# Patient Record
Sex: FEMALE | Race: BLACK OR AFRICAN AMERICAN | Employment: FULL TIME | URBAN - METROPOLITAN AREA
[De-identification: names, ages, dates, MRNs, and addresses within clinical notes are randomized per-mention and may not be internally consistent; named-entity substitution may affect disease eponyms.]

---

## 2017-08-15 ENCOUNTER — APPOINTMENT (OUTPATIENT)
Dept: PHYSICAL THERAPY | Age: 22
End: 2017-08-15

## 2017-09-07 ENCOUNTER — HOSPITAL ENCOUNTER (OUTPATIENT)
Dept: PHYSICAL THERAPY | Age: 22
Discharge: HOME | End: 2017-09-07
Payer: OTHER GOVERNMENT

## 2017-09-07 PROCEDURE — 97162 PT EVAL MOD COMPLEX 30 MIN: CPT

## 2017-09-07 PROCEDURE — 97530 THERAPEUTIC ACTIVITIES: CPT
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
## 2017-09-13 ENCOUNTER — HOSPITAL ENCOUNTER (OUTPATIENT)
Dept: PHYSICAL THERAPY | Age: 22
End: 2017-09-13
Payer: OTHER GOVERNMENT

## 2017-09-14 ENCOUNTER — HOSPITAL ENCOUNTER (OUTPATIENT)
Dept: PHYSICAL THERAPY | Age: 22
Discharge: HOME | End: 2017-09-14
Payer: OTHER GOVERNMENT

## 2017-09-14 PROCEDURE — 97530 THERAPEUTIC ACTIVITIES: CPT

## 2017-09-14 PROCEDURE — 97110 THERAPEUTIC EXERCISES: CPT

## 2017-09-14 NOTE — PROGRESS NOTES
PT DAILY TREATMENT NOTE     Patient Name: Zak Mis  Date:2017  : 1995  [x]  Patient  Verified  Payor:  / Plan: [facility]  ACTIVE DUTY AND DEPENDENTS / Product Type:  /    In time:5:29  Out time:6:21  Total Treatment Time (min): 52  Visit #: 2 of 12    Treatment Area: Dorsalgia, unspecified [M54.9]    SUBJECTIVE  Pain Level (0-10 scale): 0  Any medication changes, allergies to medications, adverse drug reactions, diagnosis change, or new procedure performed?: [x] No    [] Yes (see summary sheet for update)  Subjective functional status/changes:   [] No changes reported  \"I have been doing the exercises at home and it seems to be helping. \"    OBJECTIVE     37 min Therapeutic Exercise:  [x] See flow sheet :   Rationale: increase ROM, increase strength and improve coordination to improve the patients ability to complete daily activities with decreased pain and symptom levels. 15 min Therapeutic Activity:  [x]  See flow sheet :   Rationale: increase strength, improve coordination and increase proprioception  to improve the patients ability to complete daily activities with decreased pain and symptom levels. With   [] TE   [] TA   [] neuro   [] other: Patient Education: [x] Review HEP    [] Progressed/Changed HEP based on:   [] positioning   [] body mechanics   [] transfers   [] heat/ice application    [] other:      Other Objective/Functional Measures: Improved thoracic mobility with cat/camel     Pain Level (0-10 scale) post treatment: 0    ASSESSMENT/Changes in Function: Pt tolerated exercises well with no reports of increased pain at end of session. Improved thoracic mobility noted with cat/camel and prayer stretch.       Patient will continue to benefit from skilled PT services to modify and progress therapeutic interventions, address functional mobility deficits, address ROM deficits, address strength deficits, analyze and address soft tissue restrictions, analyze and modify body mechanics/ergonomics, assess and modify postural abnormalities and instruct in home and community integration to attain remaining goals. []  See Plan of Care  []  See progress note/recertification  []  See Discharge Summary         Progress towards goals / Updated goals:  Short Term Goals: STG- To be accomplished in 2 week(s):  1.  Pt will be independent with HEP to encourage prophylaxis. Eval:HEP dispensed  Current: Compliance per pt report       Long Term Goals: LTG- To be accomplished in 6 week(s):  1.  Pt will demonstrate ability to  son with pain < 1/10 to improve quality of life. Eval:9  Current: NA      2.  Pt lumbar AROM will be [facility] [facility] without pain in order to return to the gym. Eval:  ROM % AROM Comments:pain, area   Forward flexion 40-60 100%      Extension 20-30 100% p! SB right 20-30  tib tub      SB left 20-30 tib tub p! Rotation right 5-10 75% p! Rotation left 5-10 75%          Current: NA      3.  Pt bilateral HS and glut strength will improve to [facility] allow pt to carry laundry up the stairs without pain. Eval:4/5 bilaterally   Current: NA      4.  Pt FOTO score will increase by >15 points to show improvement in subjective function.   Eval:54  Current: will address at visit 5      PLAN  [x]  Upgrade activities as tolerated     [x]  Continue plan of care  []  Update interventions per flow sheet       []  Discharge due to:_  []  Other:_      Gilbert Arenas 9/14/2017  5:46 PM    Future Appointments  Date Time Provider [place] Pérez   9/20/2017 5:00 PM Brian Pritchardesic [facility]   9/21/2017 5:30 PM Brian Pritchardesic [facility]   9/27/2017 5:00 PM Gilbert Arenas [facility]   9/28/2017 5:30 PM Brian Pritchardesic [facility]

## 2017-09-20 ENCOUNTER — HOSPITAL ENCOUNTER (OUTPATIENT)
Dept: PHYSICAL THERAPY | Age: 22
Discharge: HOME | End: 2017-09-20
Payer: OTHER GOVERNMENT

## 2017-09-20 PROCEDURE — 97110 THERAPEUTIC EXERCISES: CPT

## 2017-09-20 PROCEDURE — 97530 THERAPEUTIC ACTIVITIES: CPT

## 2017-09-20 NOTE — PROGRESS NOTES
PT DAILY TREATMENT NOTE     Patient Name: Zurdo Coronel  Date:2017  : 1995  [x]  Patient  Verified  Payor:  / Plan: [facility]  ACTIVE DUTY AND DEPENDENTS / Product Type: [employer] /    In time:5:10  Out time:6:09  Total Treatment Time (min): 61  Visit #: 3 of 12    Treatment Area: Dorsalgia, unspecified [M54.9]    SUBJECTIVE  Pain Level (0-10 scale): 0  Any medication changes, allergies to medications, adverse drug reactions, diagnosis change, or new procedure performed?: [x] No    [] Yes (see summary sheet for update)  Subjective functional status/changes:   [] No changes reported  \" I still get the sharp pains in my back but I don't have to crack it anymore like I used to. \"    OBJECTIVE    44 min Therapeutic Exercise:  [x] See flow sheet :   Rationale: increase ROM, increase strength and improve coordination to improve the patients ability to complete daily activities with decreased pain and symptom levels. 15 min Therapeutic Activity:  [x]  See flow sheet :   Rationale: increase ROM, increase strength, improve coordination and increase proprioception  to improve the patients ability to complete daily activities with decreased pain and symptom levels. With   [] TE   [] TA   [] neuro   [] other: Patient Education: [x] Review HEP    [] Progressed/Changed HEP based on:   [] positioning   [] body mechanics   [] transfers   [] heat/ice application    [] other:      Other Objective/Functional Measures: inc lumbar ext with TRX sqauts     Pain Level (0-10 scale) post treatment: 0    ASSESSMENT/Changes in Function: Pt continues to be challenged with 3 way hip and clamshells due to glut fatigue. Verbal cues needed to correct form with 90/90 and TRX squats today to facilitate gluts and HS.      Patient will continue to benefit from skilled PT services to modify and progress therapeutic interventions, address functional mobility deficits, address ROM deficits, address strength deficits, analyze and cue movement patterns and analyze and modify body mechanics/ergonomics to attain remaining goals. []  See Plan of Care  []  See progress note/recertification  []  See Discharge Summary         Progress towards goals / Updated goals:  Short Term Goals: STG- To be accomplished in 2 week(s):  1.  Pt will be independent with HEP to encourage prophylaxis. Eval:HEP dispensed  Current: Compliance per pt report       Long Term Goals: LTG- To be accomplished in 6 week(s):  1.  Pt will demonstrate ability to  son with pain < 1/10 to improve quality of life. Eval:9  Current: NA      2.  Pt lumbar AROM will be [facility] without pain in order to return to the gym. Eval:  ROM % AROM Comments:pain, area   Forward flexion 40-60 100%      Extension 20-30 100% p! SB right 20-30  tib tub      SB left 20-30 tib tub p! Rotation right 5-10 75% p! Rotation left 5-10 75%          Current: NA      3.  Pt bilateral HS and glut strength will improve to [facility] allow pt to carry laundry up the stairs without pain. Eval:4/5 bilaterally   Current: NA      4.  Pt FOTO score will increase by >15 points to show improvement in subjective function.   Eval:54  Current: will address at visit 5      PLAN  [x]  Upgrade activities as tolerated     [x]  Continue plan of care  []  Update interventions per flow sheet       []  Discharge due to:_  []  Other:_      Marie Bro 9/20/2017  5:11 PM    Future Appointments  Date Time Provider [place] Pérez   9/21/2017 5:30 PM Marie Bro [facility]   9/27/2017 5:00 PM Javi Clinton [facility]   9/28/2017 5:30 PM Marie Bro [facility]

## 2017-09-27 ENCOUNTER — HOSPITAL ENCOUNTER (OUTPATIENT)
Dept: PHYSICAL THERAPY | Age: 22
Discharge: HOME | End: 2017-09-27
Payer: OTHER GOVERNMENT

## 2017-09-27 PROCEDURE — 97110 THERAPEUTIC EXERCISES: CPT

## 2017-09-27 PROCEDURE — 97530 THERAPEUTIC ACTIVITIES: CPT

## 2017-09-27 NOTE — PROGRESS NOTES
PT DAILY TREATMENT NOTE     Patient Name: Joe Session  Date:2017  : 1995  [x]  Patient  Verified  Payor:  / Plan: [facility]  ACTIVE DUTY AND DEPENDENTS / Product Type:  /    In time:5:25  Out time:6:04  Total Treatment Time (min): 39  Visit #: 4 of 12    Treatment Area: Dorsalgia, unspecified [M54.9]    SUBJECTIVE  Pain Level (0-10 scale): 0  Any medication changes, allergies to medications, adverse drug reactions, diagnosis change, or new procedure performed?: [x] No    [] Yes (see summary sheet for update)  Subjective functional status/changes:   [] No changes reported  \"No longer having sharp pains but randomly cramp up when leaning over too long or sitting too long. \"    OBJECTIVE      29 min Therapeutic Exercise:  [x] See flow sheet :   Rationale: increase ROM, increase strength and improve coordination to improve the patients ability to complete daily activities with     10 min Therapeutic Activity:  [x]  See flow sheet :   Rationale: increase ROM, increase strength, improve coordination, improve balance and increase proprioception  to improve the patients ability to complete daily activities with decreased pain and symptom levels. With   [] TE   [] TA   [] neuro   [] other: Patient Education: [x] Review HEP    [] Progressed/Changed HEP based on:   [] positioning   [] body mechanics   [] transfers   [] heat/ice application    [] other:      Other Objective/Functional Measures: dec height with bridges, inc lumbar ext with dead bug     Pain Level (0-10 scale) post treatment: 0    ASSESSMENT/Changes in Function: Tolerated exercises well with no reports of increased pain. Continued glut weakness with decreased height during bridges. Updated HEP to include bridges and clams. Verbal cues needed to decreased lumbar extension with dead bug exercise.      Patient will continue to benefit from skilled PT services to modify and progress therapeutic interventions, address functional mobility deficits, address ROM deficits, address strength deficits, analyze and modify body mechanics/ergonomics, assess and modify postural abnormalities and instruct in home and community integration to attain remaining goals. []  See Plan of Care  []  See progress note/recertification  []  See Discharge Summary         Progress towards goals / Updated goals:  Short Term Goals: STG- To be accomplished in 2 week(s):  1.  Pt will be independent with HEP to encourage prophylaxis. Eval:HEP dispensed  Current: Compliance per pt report       Long Term Goals: LTG- To be accomplished in 6 week(s):  1.  Pt will demonstrate ability to  son with pain < 1/10 to improve quality of life. Eval:9  Current: 5/10 - progressing      2.  Pt lumbar AROM will be [employer] without pain in order to return to the gym. Eval:  ROM % AROM Comments:pain, area   Forward flexion 40-60 100%      Extension 20-30 100% p! SB right 20-30  tib tub      SB left 20-30 tib tub p! Rotation right 5-10 75% p! Rotation left 5-10 75%          Current: NA      3.  Pt bilateral HS and glut strength will improve to [facility] allow pt to carry laundry up the stairs without pain. Eval:4/5 bilaterally   Current: NA      4.  Pt FOTO score will increase by >15 points to show improvement in subjective function.   Eval:54  Current: will address at visit 5      PLAN  [x]  Upgrade activities as tolerated     [x]  Continue plan of care  []  Update interventions per flow sheet       []  Discharge due to:_  []  Other:_       Tula Riedel Remesic 9/27/2017  5:30 PM    Future Appointments  Date Time Provider Sabi Pérez   9/28/2017 5:30 PM Ai HEAD [facility]

## 2017-09-28 ENCOUNTER — HOSPITAL ENCOUNTER (OUTPATIENT)
Dept: PHYSICAL THERAPY | Age: 22
Discharge: HOME | End: 2017-09-28
Payer: OTHER GOVERNMENT

## 2017-09-28 PROCEDURE — 97110 THERAPEUTIC EXERCISES: CPT

## 2017-09-28 PROCEDURE — 97530 THERAPEUTIC ACTIVITIES: CPT

## 2017-09-28 NOTE — PROGRESS NOTES
PT DAILY TREATMENT NOTE     Patient Name: Brooke Cordoba  Date:2017  : 1995  [x]  Patient  Verified  Payor:  / Plan: [facility]  ACTIVE DUTY AND DEPENDENTS / Product Type:  /    In time:5:22  Out time:6:23  Total Treatment Time (min): 61  Visit #: 5 of 12    Treatment Area: Dorsalgia, unspecified [M54.9]    SUBJECTIVE  Pain Level (0-10 scale): 0  Any medication changes, allergies to medications, adverse drug reactions, diagnosis change, or new procedure performed?: [x] No    [] Yes (see summary sheet for update)  Subjective functional status/changes:   [] No changes reported  \"My HS are sore today from yesterday doing the band. \"    OBJECTIVE    51 min Therapeutic Exercise:  [x] See flow sheet :   Rationale: increase ROM, increase strength and improve coordination to improve the patients ability to complete daily activities with decreased pain and symptom levels. 10 min Therapeutic Activity:  [x]  See flow sheet :   Rationale: increase ROM, increase strength, improve coordination and increase proprioception  to improve the patients ability to complete daily activities with decreased pain and symptom levels. With   [] TE   [] TA   [] neuro   [] other: Patient Education: [x] Review HEP    [] Progressed/Changed HEP based on:   [] positioning   [] body mechanics   [] transfers   [] heat/ice application    [] other:      Other Objective/Functional Measures: verbal cues to maintain PPT with dead bug, improved lumbar AROM with decreased pain - see goals below, FOTO score improved to 69     Pain Level (0-10 scale) post treatment: 0    ASSESSMENT/Changes in Function:Decreased pain with lumbar AROM. Continues to tolerate current program well with reports of decreased overall pain just soreness now. Still needs verbal cues to maintain PPT with dead bug due to decreased core and glut strength.      Patient will continue to benefit from skilled PT services to modify and progress therapeutic interventions, address functional mobility deficits, address ROM deficits, address strength deficits, analyze and modify body mechanics/ergonomics and instruct in home and community integration to attain remaining goals. []  See Plan of Care  []  See progress note/recertification  []  See Discharge Summary         Progress towards goals / Updated goals:  Short Term Goals: STG- To be accomplished in 2 week(s):  1.  Pt will be independent with HEP to encourage prophylaxis. Eval:HEP dispensed  Current: Compliance per pt report       Long Term Goals: LTG- To be accomplished in 6 week(s):  1.  Pt will demonstrate ability to  son with pain < 1/10 to improve quality of life. Eval:9  Current: 5/10 - progressing      2.  Pt lumbar AROM will be [employer] without pain in order to return to the gym. Eval:  ROM % AROM Comments:pain, area   Forward flexion 40-60 100%      Extension 20-30 100% p! SB right 20-30  tib tub      SB left 20-30 tib tub p! Rotation right 5-10 75% p! Rotation left 5-10 75%          Current:    ROM % AROM Comments:pain, area   Forward flexion 40-60 100%      Extension 20-30 100% p! SB right 20-30  tib tub      SB left 20-30 tib tub    Rotation right 5-10 75% Slight p! Rotation left 5-10 75% Slight  p!         3.  Pt bilateral HS and glut strength will improve to [facility] allow pt to carry laundry up the stairs without pain. Eval:4/5 bilaterally   Current: NA      4.  Pt FOTO score will increase by >15 points to show improvement in subjective function.   Eval:54  Current: 71 - progressing       PLAN  [x]  Upgrade activities as tolerated     [x]  Continue plan of care  []  Update interventions per flow sheet       []  Discharge due to:_  []  Other:_      Nadir Espana 9/28/2017  5:27 PM    Future Appointments  Date Time Provider Sabi Pérez   9/28/2017 5:30 PM Nadir Espana [facility]

## 2017-10-03 ENCOUNTER — APPOINTMENT (OUTPATIENT)
Dept: PHYSICAL THERAPY | Age: 22
End: 2017-10-03

## 2017-10-10 ENCOUNTER — APPOINTMENT (OUTPATIENT)
Dept: PHYSICAL THERAPY | Age: 22
End: 2017-10-10

## 2017-10-12 ENCOUNTER — APPOINTMENT (OUTPATIENT)
Dept: PHYSICAL THERAPY | Age: 22
End: 2017-10-12

## 2017-10-17 ENCOUNTER — APPOINTMENT (OUTPATIENT)
Dept: PHYSICAL THERAPY | Age: 22
End: 2017-10-17

## 2017-10-19 ENCOUNTER — APPOINTMENT (OUTPATIENT)
Dept: PHYSICAL THERAPY | Age: 22
End: 2017-10-19

## 2017-10-24 ENCOUNTER — APPOINTMENT (OUTPATIENT)
Dept: PHYSICAL THERAPY | Age: 22
End: 2017-10-24

## 2017-10-26 ENCOUNTER — APPOINTMENT (OUTPATIENT)
Dept: PHYSICAL THERAPY | Age: 22
End: 2017-10-26

## 2018-04-24 NOTE — PROGRESS NOTES
[facility] at the [address], [place], [address] [facility]  Phone: [phone]      Fax:  [phone]    Discharge Summary    Patient name: Tonie Lind     Start of Care: 17  Referral source: Lauren Massey MD    : 1995  Medical/Treatment Diagnosis: Dorsalgia, unspecified [M54.9]  Onset Date:2016  Prior Hospitalization: see medical history   Provider#: [id number]  Comorbidities: back pain,   Prior Level of Function: indep with daily activities without pain, able to  son without back pain   Medications: Verified on Patient Summary List    Visits from Start of Care: 5    Missed Visits: 3  Reporting Period : 17 to 17    Short Term Goals: STG- To be accomplished in 2 week(s):  1.  Pt will be independent with HEP to encourage prophylaxis. Eval:HEP dispensed  Current: Compliance per pt report - goal Met      Long Term Goals: LTG- To be accomplished in 6 week(s):  1.  Pt will demonstrate ability to  son with pain < 1/10 to improve quality of life. Eval:9  Current: 5/10 - progressing      2.  Pt lumbar AROM will be [employer] [facility] without pain in order to return to the gym. Eval:  ROM % AROM Comments:pain, area   Forward flexion 40-60 100%      Extension 20-30 100% p! SB right 20-30  tib tub      SB left 20-30 tib tub p! Rotation right 5-10 75% p! Rotation left 5-10 75%          Current:  progressing   ROM % AROM Comments:pain, area   Forward flexion 40-60 100%      Extension 20-30 100% p! SB right 20-30  tib tub      SB left 20-30 tib tub     Rotation right 5-10 75% Slight p! Rotation left 5-10 75% Slight  p!          3.  Pt bilateral HS and glut strength will improve to [facility] allow pt to carry laundry up the stairs without pain. Eval:4/5 bilaterally   Current: NT since eval      4.  Pt FOTO score will increase by >15 points to show improvement in subjective function.   Eval:54  Current: 69 - progressing     Assessment/ Summary of Care: Pt presented to therapy with c/c mid back pain starting Jan 2016 with insidious onset. Pt attended 5 sessions focusing on improving overall strength, posture, flexibility and joint mobility with reporting decreased overall pain with ADLs. Pt is ready to be discharged at this time due to pt request of feeling better and no longer needing PT.      RECOMMENDATIONS:  [x]Discontinue therapy: [x]Patient has reached or is progressing toward set goals      []Patient is non-compliant or has abdicated      []Due to lack of appreciable progress towards set goals    Tanvir Manual Remesic 4/24/2018 9:28 AM